# Patient Record
Sex: FEMALE | Race: ASIAN | Employment: FULL TIME | URBAN - NONMETROPOLITAN AREA
[De-identification: names, ages, dates, MRNs, and addresses within clinical notes are randomized per-mention and may not be internally consistent; named-entity substitution may affect disease eponyms.]

---

## 2024-03-26 ENCOUNTER — OFFICE VISIT (OUTPATIENT)
Dept: OBGYN CLINIC | Age: 39
End: 2024-03-26

## 2024-03-26 VITALS — SYSTOLIC BLOOD PRESSURE: 102 MMHG | WEIGHT: 122.2 LBS | DIASTOLIC BLOOD PRESSURE: 64 MMHG

## 2024-03-26 DIAGNOSIS — N63.41 SUBAREOLAR MASS OF RIGHT BREAST: Primary | ICD-10-CM

## 2024-03-26 PROCEDURE — 99203 OFFICE O/P NEW LOW 30 MIN: CPT

## 2024-03-26 NOTE — PROGRESS NOTES
DATE OF VISIT:  3/26/24    PATIENT NAME:  Jamar Delgado     YOB: 1985    REASON FOR VISIT:    Chief Complaint   Patient presents with    Breast Mass     Has a walnut size breast lump on her right breast which she noticed about 2 weeks ago. It is mildly tender to touch.         HISTORY OF PRESENT ILLNESS:  Patient presents to [facility] care.  Reports that she noticed right breast lump about 2 weeks ago.  Reports that it is tender to palpation but otherwise not bothersome.  Denies overlying skin changes, nipple discharge.  Reports last mammogram was about 5-6 years ago.          Patient's last menstrual period was 03/23/2024 (exact date).  Vitals:    03/26/24 1121   BP: 102/64   Weight: 55.4 kg (122 lb 3.2 oz)     There is no height or weight on file to calculate BMI.  Allergies   Allergen Reactions    Latex Hives     No current outpatient medications on file.     No current facility-administered medications for this visit.     Social History     Socioeconomic History    Marital status:      Spouse name: None    Number of children: None    Years of education: None    Highest education level: None   Tobacco Use    Smoking status: Former     Types: Cigarettes    Smokeless tobacco: Never   Vaping Use    Vaping Use: Never used   Substance and Sexual Activity    Alcohol use: Not Currently     Alcohol/week: 1.0 standard drink of alcohol     Types: 1 Glasses of wine per week    Drug use: Never    Sexual activity: Yes     Partners: Male       REVIEW OF SYSTEMS:  Review of Systems   Constitutional:  Negative for chills, fatigue and fever.   Genitourinary:         Right breast lump   Skin:  Negative for rash.       PHYSICAL EXAM:  /64   Wt 55.4 kg (122 lb 3.2 oz)   LMP 03/23/2024 (Exact Date)   Physical Exam  Constitutional:       Appearance: Normal appearance.   Genitourinary:   Breasts:     Right: Mass and tenderness present. No inverted nipple, nipple discharge or skin change.